# Patient Record
Sex: FEMALE | Race: WHITE | Employment: FULL TIME | URBAN - NONMETROPOLITAN AREA
[De-identification: names, ages, dates, MRNs, and addresses within clinical notes are randomized per-mention and may not be internally consistent; named-entity substitution may affect disease eponyms.]

---

## 2018-01-26 ENCOUNTER — HOSPITAL ENCOUNTER (EMERGENCY)
Age: 38
Discharge: HOME | End: 2018-01-26
Payer: COMMERCIAL

## 2018-01-26 ENCOUNTER — APPOINTMENT (OUTPATIENT)
Dept: CT IMAGING | Age: 38
End: 2018-01-26
Payer: COMMERCIAL

## 2018-01-26 ENCOUNTER — APPOINTMENT (OUTPATIENT)
Dept: ULTRASOUND IMAGING | Age: 38
End: 2018-01-26
Payer: COMMERCIAL

## 2018-01-26 VITALS
BODY MASS INDEX: 17.63 KG/M2 | OXYGEN SATURATION: 96 % | SYSTOLIC BLOOD PRESSURE: 110 MMHG | WEIGHT: 84 LBS | HEART RATE: 83 BPM | DIASTOLIC BLOOD PRESSURE: 61 MMHG | TEMPERATURE: 98 F | HEIGHT: 58 IN | RESPIRATION RATE: 15 BRPM

## 2018-01-26 DIAGNOSIS — N83.209 CYST OF OVARY, UNSPECIFIED LATERALITY: ICD-10-CM

## 2018-01-26 DIAGNOSIS — N39.0 URINARY TRACT INFECTION WITHOUT HEMATURIA, SITE UNSPECIFIED: ICD-10-CM

## 2018-01-26 DIAGNOSIS — R10.9 FLANK PAIN: Primary | ICD-10-CM

## 2018-01-26 LAB
ALBUMIN SERPL-MCNC: 4.5 G/DL (ref 3.5–5.1)
ALP BLD-CCNC: 70 U/L (ref 38–126)
ALT SERPL-CCNC: 9 U/L (ref 11–66)
AMPHETAMINE+METHAMPHETAMINE URINE SCREEN: NORMAL
ANION GAP SERPL CALCULATED.3IONS-SCNC: 14 MEQ/L (ref 8–16)
AST SERPL-CCNC: 11 U/L (ref 5–40)
BACTERIA: ABNORMAL /HPF
BARBITURATE QUANTITATIVE URINE: NEGATIVE
BASOPHILS # BLD: 0.5 %
BASOPHILS ABSOLUTE: 0.1 THOU/MM3 (ref 0–0.1)
BENZODIAZEPINE QUANTITATIVE URINE: NEGATIVE
BILIRUB SERPL-MCNC: 0.3 MG/DL (ref 0.3–1.2)
BILIRUBIN DIRECT: < 0.2 MG/DL (ref 0–0.3)
BILIRUBIN URINE: NEGATIVE
BLOOD, URINE: NEGATIVE
BUN BLDV-MCNC: 15 MG/DL (ref 7–22)
CALCIUM SERPL-MCNC: 9 MG/DL (ref 8.5–10.5)
CANNABINOID QUANTITATIVE URINE: NEGATIVE
CASTS 2: ABNORMAL /LPF
CASTS UA: ABNORMAL /LPF
CHARACTER, URINE: ABNORMAL
CHLORIDE BLD-SCNC: 99 MEQ/L (ref 98–111)
CO2: 25 MEQ/L (ref 23–33)
COCAINE METABOLITE QUANTITATIVE URINE: NEGATIVE
COLOR: ABNORMAL
CREAT SERPL-MCNC: 0.6 MG/DL (ref 0.4–1.2)
CRYSTALS, UA: ABNORMAL
EOSINOPHIL # BLD: 0.7 %
EOSINOPHILS ABSOLUTE: 0.1 THOU/MM3 (ref 0–0.4)
EPITHELIAL CELLS, UA: ABNORMAL /HPF
GFR SERPL CREATININE-BSD FRML MDRD: > 90 ML/MIN/1.73M2
GLUCOSE BLD-MCNC: 99 MG/DL (ref 70–108)
GLUCOSE URINE: NEGATIVE MG/DL
HCT VFR BLD CALC: 44.8 % (ref 37–47)
HEMOGLOBIN: 15.3 GM/DL (ref 12–16)
KETONES, URINE: 15
LACTIC ACID: 0.8 MMOL/L (ref 0.5–2.2)
LEUKOCYTE ESTERASE, URINE: NEGATIVE
LIPASE: 18.9 U/L (ref 5.6–51.3)
LYMPHOCYTES # BLD: 20.5 %
LYMPHOCYTES ABSOLUTE: 2.4 THOU/MM3 (ref 1–4.8)
MCH RBC QN AUTO: 32.5 PG (ref 27–31)
MCHC RBC AUTO-ENTMCNC: 34 GM/DL (ref 33–37)
MCV RBC AUTO: 95.5 FL (ref 81–99)
MISCELLANEOUS 2: ABNORMAL
MONOCYTES # BLD: 4.3 %
MONOCYTES ABSOLUTE: 0.5 THOU/MM3 (ref 0.4–1.3)
NITRITE, URINE: NEGATIVE
NUCLEATED RED BLOOD CELLS: 0 /100 WBC
OPIATES, URINE: NORMAL
OSMOLALITY CALCULATION: 276.5 MOSMOL/KG (ref 275–300)
OXYCODONE: POSITIVE
PDW BLD-RTO: 12.6 % (ref 11.5–14.5)
PH UA: 5.5
PHENCYCLIDINE QUANTITATIVE URINE: NEGATIVE
PLATELET # BLD: 354 THOU/MM3 (ref 130–400)
PMV BLD AUTO: 8.1 MCM (ref 7.4–10.4)
POTASSIUM SERPL-SCNC: 4 MEQ/L (ref 3.5–5.2)
PREGNANCY, SERUM: NEGATIVE
PROTEIN UA: NEGATIVE
RBC # BLD: 4.7 MILL/MM3 (ref 4.2–5.4)
RBC URINE: ABNORMAL /HPF
RENAL EPITHELIAL, UA: ABNORMAL
SEG NEUTROPHILS: 74 %
SEGMENTED NEUTROPHILS ABSOLUTE COUNT: 8.6 THOU/MM3 (ref 1.8–7.7)
SODIUM BLD-SCNC: 138 MEQ/L (ref 135–145)
SPECIFIC GRAVITY, URINE: > 1.03 (ref 1–1.03)
TOTAL PROTEIN: 7.8 G/DL (ref 6.1–8)
UROBILINOGEN, URINE: 0.2 EU/DL
WBC # BLD: 11.6 THOU/MM3 (ref 4.8–10.8)
WBC UA: ABNORMAL /HPF
YEAST: ABNORMAL

## 2018-01-26 PROCEDURE — 6370000000 HC RX 637 (ALT 250 FOR IP): Performed by: PHYSICIAN ASSISTANT

## 2018-01-26 PROCEDURE — 96374 THER/PROPH/DIAG INJ IV PUSH: CPT

## 2018-01-26 PROCEDURE — 80053 COMPREHEN METABOLIC PANEL: CPT

## 2018-01-26 PROCEDURE — 96375 TX/PRO/DX INJ NEW DRUG ADDON: CPT

## 2018-01-26 PROCEDURE — 93975 VASCULAR STUDY: CPT

## 2018-01-26 PROCEDURE — 99284 EMERGENCY DEPT VISIT MOD MDM: CPT

## 2018-01-26 PROCEDURE — 85025 COMPLETE CBC W/AUTO DIFF WBC: CPT

## 2018-01-26 PROCEDURE — 83605 ASSAY OF LACTIC ACID: CPT

## 2018-01-26 PROCEDURE — 6360000002 HC RX W HCPCS: Performed by: PHYSICIAN ASSISTANT

## 2018-01-26 PROCEDURE — 36415 COLL VENOUS BLD VENIPUNCTURE: CPT

## 2018-01-26 PROCEDURE — 74176 CT ABD & PELVIS W/O CONTRAST: CPT

## 2018-01-26 PROCEDURE — 84703 CHORIONIC GONADOTROPIN ASSAY: CPT

## 2018-01-26 PROCEDURE — 76830 TRANSVAGINAL US NON-OB: CPT

## 2018-01-26 PROCEDURE — 83690 ASSAY OF LIPASE: CPT

## 2018-01-26 PROCEDURE — 87086 URINE CULTURE/COLONY COUNT: CPT

## 2018-01-26 PROCEDURE — 80305 DRUG TEST PRSMV DIR OPT OBS: CPT

## 2018-01-26 PROCEDURE — 81001 URINALYSIS AUTO W/SCOPE: CPT

## 2018-01-26 PROCEDURE — 82248 BILIRUBIN DIRECT: CPT

## 2018-01-26 RX ORDER — KETOROLAC TROMETHAMINE 30 MG/ML
30 INJECTION, SOLUTION INTRAMUSCULAR; INTRAVENOUS ONCE
Status: COMPLETED | OUTPATIENT
Start: 2018-01-26 | End: 2018-01-26

## 2018-01-26 RX ORDER — HYDROCODONE BITARTRATE AND ACETAMINOPHEN 5; 325 MG/1; MG/1
1 TABLET ORAL ONCE
Status: COMPLETED | OUTPATIENT
Start: 2018-01-26 | End: 2018-01-26

## 2018-01-26 RX ORDER — ONDANSETRON 2 MG/ML
4 INJECTION INTRAMUSCULAR; INTRAVENOUS ONCE
Status: COMPLETED | OUTPATIENT
Start: 2018-01-26 | End: 2018-01-26

## 2018-01-26 RX ORDER — PHENAZOPYRIDINE HYDROCHLORIDE 200 MG/1
200 TABLET, FILM COATED ORAL 3 TIMES DAILY PRN
Qty: 6 TABLET | Refills: 0 | Status: SHIPPED | OUTPATIENT
Start: 2018-01-26 | End: 2018-01-29

## 2018-01-26 RX ORDER — CIPROFLOXACIN 500 MG/1
500 TABLET, FILM COATED ORAL 2 TIMES DAILY
Qty: 14 TABLET | Refills: 0 | Status: SHIPPED | OUTPATIENT
Start: 2018-01-26 | End: 2018-02-02 | Stop reason: ALTCHOICE

## 2018-01-26 RX ADMIN — ONDANSETRON 4 MG: 2 INJECTION INTRAMUSCULAR; INTRAVENOUS at 08:59

## 2018-01-26 RX ADMIN — KETOROLAC TROMETHAMINE 30 MG: 30 INJECTION, SOLUTION INTRAMUSCULAR at 08:59

## 2018-01-26 RX ADMIN — HYDROCODONE BITARTRATE AND ACETAMINOPHEN 1 TABLET: 5; 325 TABLET ORAL at 10:38

## 2018-01-26 ASSESSMENT — ENCOUNTER SYMPTOMS
COLOR CHANGE: 0
BACK PAIN: 0
EYE DISCHARGE: 0
RHINORRHEA: 0
COUGH: 0
DIARRHEA: 0
SHORTNESS OF BREATH: 0
SORE THROAT: 0
EYE PAIN: 0
NAUSEA: 1
ABDOMINAL PAIN: 1
VOMITING: 0
EYE ITCHING: 0
WHEEZING: 0

## 2018-01-26 ASSESSMENT — PAIN DESCRIPTION - PAIN TYPE
TYPE: ACUTE PAIN
TYPE: ACUTE PAIN

## 2018-01-26 ASSESSMENT — PAIN SCALES - GENERAL
PAINLEVEL_OUTOF10: 5
PAINLEVEL_OUTOF10: 7
PAINLEVEL_OUTOF10: 10
PAINLEVEL_OUTOF10: 8
PAINLEVEL_OUTOF10: 10

## 2018-01-26 ASSESSMENT — PAIN DESCRIPTION - ORIENTATION
ORIENTATION: LEFT;RIGHT
ORIENTATION: RIGHT
ORIENTATION: RIGHT

## 2018-01-26 ASSESSMENT — PATIENT HEALTH QUESTIONNAIRE - PHQ9: SUM OF ALL RESPONSES TO PHQ QUESTIONS 1-9: 8

## 2018-01-26 ASSESSMENT — PAIN DESCRIPTION - LOCATION
LOCATION: FLANK

## 2018-01-26 ASSESSMENT — PAIN DESCRIPTION - FREQUENCY
FREQUENCY: CONTINUOUS
FREQUENCY: CONTINUOUS

## 2018-01-26 ASSESSMENT — PAIN DESCRIPTION - ONSET: ONSET: ON-GOING

## 2018-01-26 ASSESSMENT — PAIN DESCRIPTION - DESCRIPTORS
DESCRIPTORS: STABBING;SHOOTING
DESCRIPTORS: SHOOTING

## 2018-01-26 ASSESSMENT — PAIN DESCRIPTION - DIRECTION
RADIATING_TOWARDS: INTO BACK
RADIATING_TOWARDS: INTO BACK

## 2018-01-26 ASSESSMENT — PAIN DESCRIPTION - PROGRESSION: CLINICAL_PROGRESSION: GRADUALLY IMPROVING

## 2018-01-26 NOTE — PROGRESS NOTES
Brief Intervention and Referral to Treatment Summary    Patient was provided PHQ-9, AUDIT and DAST Screening:      PHQ-9 Score:  8  AUDIT Score:  0  DAST Score:  3    Patients substance use is considered:  Risk, Harmful      Patients depression is considered:  Mild       Brief Education was provided:    Patient was receptive      Brief Intervention Is Provided (Only for AUDIT or DAST, there is no brief intervention for Depression)    Patient reports readiness to decrease and/or stop use and a plan was discussed       Recommendations/Referrals for Brief and/or Specialized Treatment Provided to Patient    Patient reports that her family is new to the area from [place], [place]. Patient states that she normally doesn't use drugs but \"my  persuaded me to try heroin a couple weeks ago because I've never experienced it before. \"  Patient states that she does not fear her . Patient reports that she isn't interested in any other services at this time, but did accept information about [facility] and the [facility] to locate a primary care doctor.

## 2018-01-26 NOTE — ED PROVIDER NOTES
which is within normal limits for the patient's age. No evidence of torsion        **This report has been created using voice recognition software.  It may contain minor errors which are inherent in voice recognition technology. **    Final report electronically signed by Dr. Alexis Franklin on 1/26/2018 12:22 PM            Narrative:    PROCEDURE: US NON OB TRANSVAGINAL, US DUP ABD PEL RETRO SCROT COMP    CLINICAL INFORMATION: rule out torsion pelvic pain . COMPARISON: No prior study. TECHNIQUE: Transvaginal ultrasound with grayscale color and spectral duplex imaging    FINDINGS: Right left ovaries are identified. They show arterial and venous waveforms bilaterally. There is no evidence of torsion. Right ovary demonstrates 2 separate cysts largest is 3.5 cm with no internal echoes. The smaller is 6.5 x 3.5 mm. Has no   internal echoes. Trace free fluid is seen in the right cul-de-sac. Left ovary is unremarkable there is a 1.1 cm sonolucent structure likely a mature follicle. Uterus is normal in size and echogenicity endometrial stripe is mildly thickened but within normal limits for the patient's age. The endometrial stripe is homogeneously thickened. Echogenic focus seen in the right adnexa corresponds to a linear metallic   structure on CT and is most likely an Essure device. Uterus - 8.2 x 5.3 x 4.2 cm  Endometrium - 1.4 cm   Right Ovary - 5.0 x 3.6 x 2.3 cm   Left Ovary - 3.3 x 1.9 x 1.4 cm                    CT ABDOMEN PELVIS WO IV CONTRAST Additional Contrast? None (Final result)   Result time 01/26/18 10:05:01   Final result by Adalberto Arteaga MD (01/26/18 10:05:01)                Impression:    1. There is hypertrophy of the adrenal glands bilaterally. 2. There is a vague 0.1 cm nonobstructing calculus within the midpole the left renal pelvis. The renal pyramids are slightly echogenic bilaterally consistent with nephrocalcinosis. The bilateral kidneys are otherwise unremarkable.  There is no 96% 96%   Weight: 84 lb (38.1 kg)     Height: 4' 10\" (1.473 m)       Patient was seen history physical exam was performed. See disposition below    8:47 AM Patient was seen and evaluated in a timely fashion. The patient was given Toradol. The patient also given 1 Norco PO she is feeling better. Will discharge and treat for UTI    CRITICAL CARE:   None    CONSULTS:  None    PROCEDURES:  None     FINAL IMPRESSION      1. Flank pain    2. Urinary tract infection without hematuria, site unspecified    3. Cyst of ovary, unspecified laterality          DISPOSITION/PLAN   Discharge      PATIENT REFERRED TO:  DR. Elizabeth [facility]go[facility]  [facility]  [address] [facility]  [phone]    In 3 days      Samara Lewis MD  [facility]  [address] [place] [address]    In 3 days        DISCHARGE MEDICATIONS:  Discharge Medication List as of 1/26/2018 12:32 PM      START taking these medications    Details   etodolac (LODINE) 300 MG capsule Take 1 capsule by mouth every 8 hours, Disp-30 capsule, R-0Print      phenazopyridine (PYRIDIUM) 200 MG tablet Take 1 tablet by mouth 3 times daily as needed for Pain (bladder spasm/pain), Disp-6 tablet, R-0Print      ciprofloxacin (CIPRO) 500 MG tablet Take 1 tablet by mouth 2 times daily for 7 days, Disp-14 tablet, R-0Print             (Please note that portions of this note were completed with a voice recognition program.  Efforts were made to edit the dictations but occasionally words are mis-transcribed.)    Scribe: Den Byrd 01/26/18 8:47 AM Scribing for and in the presence of [facility] CHAR[facility]. Signed by: Derik Sanchez, 01/26/18 6:02 PM    Provider:  I personally performed the services described in the documentation, reviewed and edited the documentation which was dictated to the scribe in my presence, and it accurately records my words and actions.     [facility] CHAR[facility] 01/26/18 6:02 PM Ashia Coulter, [place]  01/26/18 180

## 2018-01-27 LAB
ORGANISM: ABNORMAL
URINE CULTURE REFLEX: ABNORMAL

## 2018-02-02 ENCOUNTER — TELEPHONE (OUTPATIENT)
Dept: UROLOGY | Age: 38
End: 2018-02-02

## 2018-02-02 ENCOUNTER — OFFICE VISIT (OUTPATIENT)
Dept: UROLOGY | Age: 38
End: 2018-02-02
Payer: COMMERCIAL

## 2018-02-02 VITALS
DIASTOLIC BLOOD PRESSURE: 80 MMHG | WEIGHT: 99 LBS | SYSTOLIC BLOOD PRESSURE: 126 MMHG | HEIGHT: 58 IN | BODY MASS INDEX: 20.78 KG/M2

## 2018-02-02 DIAGNOSIS — N20.0 KIDNEY STONE: Primary | ICD-10-CM

## 2018-02-02 DIAGNOSIS — R10.84 GENERALIZED ABDOMINAL PAIN: ICD-10-CM

## 2018-02-02 DIAGNOSIS — R10.9 RIGHT FLANK PAIN: ICD-10-CM

## 2018-02-02 LAB
BILIRUBIN URINE: NORMAL
BLOOD URINE, POC: NEGATIVE
CHARACTER, URINE: CLEAR
COLOR, URINE: YELLOW
GLUCOSE URINE: NEGATIVE MG/DL
KETONES, URINE: NEGATIVE
LEUKOCYTE CLUMPS, URINE: NEGATIVE
NITRITE, URINE: NEGATIVE
PH, URINE: 7
PROTEIN, URINE: NEGATIVE MG/DL
SPECIFIC GRAVITY, URINE: 1.02 (ref 1–1.03)
UROBILINOGEN, URINE: 0.2 EU/DL

## 2018-02-02 PROCEDURE — G8427 DOCREV CUR MEDS BY ELIG CLIN: HCPCS | Performed by: NURSE PRACTITIONER

## 2018-02-02 PROCEDURE — G8484 FLU IMMUNIZE NO ADMIN: HCPCS | Performed by: NURSE PRACTITIONER

## 2018-02-02 PROCEDURE — 99203 OFFICE O/P NEW LOW 30 MIN: CPT | Performed by: NURSE PRACTITIONER

## 2018-02-02 PROCEDURE — G8420 CALC BMI NORM PARAMETERS: HCPCS | Performed by: NURSE PRACTITIONER

## 2018-02-02 PROCEDURE — 81003 URINALYSIS AUTO W/O SCOPE: CPT | Performed by: NURSE PRACTITIONER

## 2018-02-02 PROCEDURE — 4004F PT TOBACCO SCREEN RCVD TLK: CPT | Performed by: NURSE PRACTITIONER

## 2018-02-02 RX ORDER — KETOROLAC TROMETHAMINE 10 MG/1
10 TABLET, FILM COATED ORAL EVERY 6 HOURS PRN
Qty: 20 TABLET | Refills: 0 | Status: SHIPPED | OUTPATIENT
Start: 2018-02-02 | End: 2018-02-02

## 2018-02-02 RX ORDER — KETOROLAC TROMETHAMINE 10 MG/1
10 TABLET, FILM COATED ORAL EVERY 6 HOURS PRN
Qty: 20 TABLET | Refills: 0 | Status: SHIPPED | OUTPATIENT
Start: 2018-02-02 | End: 2018-03-07 | Stop reason: ALTCHOICE

## 2018-02-02 NOTE — PATIENT INSTRUCTIONS
are trying to quit smoking. · Consider signing up for a smoking cessation program, such as the American Lung Association's Freedom from Smoking program.  · Set a quit date. Pick your date carefully so that it is not right in the middle of a big deadline or stressful time. Once you quit, do not even take a puff. Get rid of all ashtrays and lighters after your last cigarette. Clean your house and your clothes so that they do not smell of smoke. · Learn how to be a nonsmoker. Think about ways you can avoid those things that make you reach for a cigarette. ¨ Avoid situations that put you at greatest risk for smoking. For some people, it is hard to have a drink with friends without smoking. For others, they might skip a coffee break with coworkers who smoke. ¨ Change your daily routine. Take a different route to work or eat a meal in a different place. · Cut down on stress. Calm yourself or release tension by doing an activity you enjoy, such as reading a book, taking a hot bath, or gardening. · Talk to your doctor or pharmacist about nicotine replacement therapy, which replaces the nicotine in your body. You still get nicotine but you do not use tobacco. Nicotine replacement products help you slowly reduce the amount of nicotine you need. These products come in several forms, many of them available over-the-counter:  ¨ Nicotine patches  ¨ Nicotine gum and lozenges  ¨ Nicotine inhaler  · Ask your doctor about bupropion (Wellbutrin) or varenicline (Chantix), which are prescription medicines. They do not contain nicotine. They help you by reducing withdrawal symptoms, such as stress and anxiety. · Some people find hypnosis, acupuncture, and massage helpful for ending the smoking habit. · Eat a healthy diet and get regular exercise. Having healthy habits will help your body move past its craving for nicotine. · Be prepared to keep trying. Most people are not successful the first few times they try to quit.  Do not get mad at yourself if you smoke again. Make a list of things you learned and think about when you want to try again, such as next week, next month, or next year. Where can you learn more? Go to https://[email or web address].Ad Summos. org and sign in to your [employer] account. Enter N100 in the Kaazing box to learn more about \"Stopping Smoking: Care Instructions. \"     If you do not have an account, please click on the \"Sign Up Now\" link. Current as of: March 20, 2017  Content Version: 11.5  © 5383-5489 Healthwise, Incorporated. Care instructions adapted under license by [facility] ([facility]). If you have questions about a medical condition or this instruction, always ask your healthcare professional. [address] any warranty or liability for your use of this information.

## 2018-02-02 NOTE — PROGRESS NOTES
anxiety or depression. Skin: Denies rash or lesions. The remainder of the complete ROS is negative. Physical Exam  Nursing note and vitals reviewed. Constitutional: Alert and oriented times 3, no acute distress and cooperative to examination with appropriate mood and affect. HENT:   Head:        Normocephalic and atraumatic. Mouth/Throat:         Mucous membranes are normal.   Eyes:         EOM are normal. No scleral icterus. PERRLA. Neck:        Supple, symmetrical, trachea midline, no adenopathy, thyroid symmetric, not enlarged and no tenderness. Cardiovascular:        Normal rate, regular rhythm, S1 S2 heart sounds. No murmurs, rub, or gallops. Pulses:       Radial pulses are 2+/4 bilateral and equal. Posterior tibialis 2+/4 bilateral and equal  Pulmonary/Chest:      Chest symmetric with normal A/P diameter,  CTA with no wheezes, rales, or rhonchi noted. Normal respiratory rate and rhthym. No use of accessory muscles. Abdominal:         Soft. No tenderness. No rebound or guarding. No CVA tenderness. Musculoskeletal:         Normal range of motion. No evidence of edema or tenderness of lower extremities. Lymphadenopathy:        No cervical adenopathy. Bilateral supraclavicular adenopathy absent. Extremities: No cyanosis, clubbing, or edema present. Neurological:        Alert and oriented. No cranial nerve deficit. There are no focalizing motor or sensory deficits. CN II-XII are grossly intact. Skin:       Skin color, texture, turgor normal. No rashes or lesions. Psychiatric:        Normal mood and affect.      Labs   WBC:    Lab Results   Component Value Date    WBC 11.6 01/26/2018     Hemoglobin/Hematocrit:    Lab Results   Component Value Date    HGB 15.3 01/26/2018    HCT 44.8 01/26/2018     BMP:    Lab Results   Component Value Date     01/26/2018    K 4.0 01/26/2018    CL 99 01/26/2018    CO2 25 01/26/2018    BUN 15 01/26/2018    LABALBU 4.5 01/26/2018    CREATININE

## 2018-02-09 ENCOUNTER — HOSPITAL ENCOUNTER (EMERGENCY)
Age: 38
Discharge: HOME | End: 2018-02-09
Payer: COMMERCIAL

## 2018-02-09 ENCOUNTER — APPOINTMENT (OUTPATIENT)
Dept: CT IMAGING | Age: 38
End: 2018-02-09
Payer: COMMERCIAL

## 2018-02-09 VITALS
HEART RATE: 88 BPM | HEIGHT: 59 IN | DIASTOLIC BLOOD PRESSURE: 72 MMHG | OXYGEN SATURATION: 98 % | WEIGHT: 95 LBS | TEMPERATURE: 99 F | BODY MASS INDEX: 19.15 KG/M2 | RESPIRATION RATE: 16 BRPM | SYSTOLIC BLOOD PRESSURE: 130 MMHG

## 2018-02-09 DIAGNOSIS — N83.201 CYST OF RIGHT OVARY: ICD-10-CM

## 2018-02-09 DIAGNOSIS — R10.30 LOWER ABDOMINAL PAIN: Primary | ICD-10-CM

## 2018-02-09 LAB
ALBUMIN SERPL-MCNC: 4.1 G/DL (ref 3.5–5.1)
ALP BLD-CCNC: 68 U/L (ref 38–126)
ALT SERPL-CCNC: 7 U/L (ref 11–66)
ANION GAP SERPL CALCULATED.3IONS-SCNC: 9 MEQ/L (ref 8–16)
AST SERPL-CCNC: 17 U/L (ref 5–40)
BACTERIA: ABNORMAL
BASOPHILS # BLD: 0.3 %
BASOPHILS ABSOLUTE: 0 THOU/MM3 (ref 0–0.1)
BILIRUB SERPL-MCNC: 0.2 MG/DL (ref 0.3–1.2)
BILIRUBIN DIRECT: < 0.2 MG/DL (ref 0–0.3)
BILIRUBIN URINE: NEGATIVE
BLOOD, URINE: ABNORMAL
BUN BLDV-MCNC: 14 MG/DL (ref 7–22)
CALCIUM SERPL-MCNC: 9.1 MG/DL (ref 8.5–10.5)
CASTS: ABNORMAL /LPF
CASTS: ABNORMAL /LPF
CHARACTER, URINE: ABNORMAL
CHLORIDE BLD-SCNC: 102 MEQ/L (ref 98–111)
CO2: 28 MEQ/L (ref 23–33)
COLOR: YELLOW
CREAT SERPL-MCNC: 0.5 MG/DL (ref 0.4–1.2)
CRYSTALS: ABNORMAL
EOSINOPHIL # BLD: 0.8 %
EOSINOPHILS ABSOLUTE: 0.1 THOU/MM3 (ref 0–0.4)
EPITHELIAL CELLS, UA: ABNORMAL /HPF
GFR SERPL CREATININE-BSD FRML MDRD: > 90 ML/MIN/1.73M2
GLUCOSE BLD-MCNC: 103 MG/DL (ref 70–108)
GLUCOSE, URINE: NEGATIVE MG/DL
HCT VFR BLD CALC: 43.2 % (ref 37–47)
HEMOGLOBIN: 14.5 GM/DL (ref 12–16)
KETONES, URINE: NEGATIVE
LEUKOCYTE ESTERASE, URINE: ABNORMAL
LIPASE: 21.9 U/L (ref 5.6–51.3)
LYMPHOCYTES # BLD: 21.5 %
LYMPHOCYTES ABSOLUTE: 1.8 THOU/MM3 (ref 1–4.8)
MCH RBC QN AUTO: 31.7 PG (ref 27–31)
MCHC RBC AUTO-ENTMCNC: 33.5 GM/DL (ref 33–37)
MCV RBC AUTO: 94.5 FL (ref 81–99)
MISCELLANEOUS LAB TEST RESULT: ABNORMAL
MONOCYTES # BLD: 5.8 %
MONOCYTES ABSOLUTE: 0.5 THOU/MM3 (ref 0.4–1.3)
NITRITE, URINE: NEGATIVE
NUCLEATED RED BLOOD CELLS: 0 /100 WBC
OSMOLALITY CALCULATION: 278.3 MOSMOL/KG (ref 275–300)
PDW BLD-RTO: 13.4 % (ref 11.5–14.5)
PH UA: 7.5
PLATELET # BLD: 341 THOU/MM3 (ref 130–400)
PMV BLD AUTO: 7.8 FL (ref 7.4–10.4)
POTASSIUM SERPL-SCNC: 3.8 MEQ/L (ref 3.5–5.2)
PREGNANCY, SERUM: NEGATIVE
PROTEIN UA: ABNORMAL MG/DL
RBC # BLD: 4.57 MILL/MM3 (ref 4.2–5.4)
RBC URINE: ABNORMAL /HPF
RENAL EPITHELIAL, UA: ABNORMAL
SEG NEUTROPHILS: 71.6 %
SEGMENTED NEUTROPHILS ABSOLUTE COUNT: 6.2 THOU/MM3 (ref 1.8–7.7)
SODIUM BLD-SCNC: 139 MEQ/L (ref 135–145)
SPECIFIC GRAVITY UA: 1.02 (ref 1–1.03)
TOTAL PROTEIN: 7.3 G/DL (ref 6.1–8)
UROBILINOGEN, URINE: 1 EU/DL
WBC # BLD: 8.6 THOU/MM3 (ref 4.8–10.8)
WBC UA: ABNORMAL /HPF
YEAST: ABNORMAL

## 2018-02-09 PROCEDURE — 74177 CT ABD & PELVIS W/CONTRAST: CPT

## 2018-02-09 PROCEDURE — 96374 THER/PROPH/DIAG INJ IV PUSH: CPT

## 2018-02-09 PROCEDURE — 85025 COMPLETE CBC W/AUTO DIFF WBC: CPT

## 2018-02-09 PROCEDURE — 36415 COLL VENOUS BLD VENIPUNCTURE: CPT

## 2018-02-09 PROCEDURE — 87086 URINE CULTURE/COLONY COUNT: CPT

## 2018-02-09 PROCEDURE — 84703 CHORIONIC GONADOTROPIN ASSAY: CPT

## 2018-02-09 PROCEDURE — 82248 BILIRUBIN DIRECT: CPT

## 2018-02-09 PROCEDURE — 83690 ASSAY OF LIPASE: CPT

## 2018-02-09 PROCEDURE — 99284 EMERGENCY DEPT VISIT MOD MDM: CPT

## 2018-02-09 PROCEDURE — 6360000002 HC RX W HCPCS: Performed by: NURSE PRACTITIONER

## 2018-02-09 PROCEDURE — 6360000004 HC RX CONTRAST MEDICATION: Performed by: NURSE PRACTITIONER

## 2018-02-09 PROCEDURE — 6370000000 HC RX 637 (ALT 250 FOR IP): Performed by: NURSE PRACTITIONER

## 2018-02-09 PROCEDURE — 80053 COMPREHEN METABOLIC PANEL: CPT

## 2018-02-09 PROCEDURE — 96375 TX/PRO/DX INJ NEW DRUG ADDON: CPT

## 2018-02-09 PROCEDURE — 81001 URINALYSIS AUTO W/SCOPE: CPT

## 2018-02-09 RX ORDER — TRAMADOL HYDROCHLORIDE 50 MG/1
50 TABLET ORAL EVERY 6 HOURS PRN
Qty: 12 TABLET | Refills: 0 | Status: SHIPPED | OUTPATIENT
Start: 2018-02-09 | End: 2018-02-12

## 2018-02-09 RX ORDER — ONDANSETRON 2 MG/ML
4 INJECTION INTRAMUSCULAR; INTRAVENOUS ONCE
Status: COMPLETED | OUTPATIENT
Start: 2018-02-09 | End: 2018-02-09

## 2018-02-09 RX ORDER — TRAMADOL HYDROCHLORIDE 50 MG/1
50 TABLET ORAL ONCE
Status: COMPLETED | OUTPATIENT
Start: 2018-02-09 | End: 2018-02-09

## 2018-02-09 RX ORDER — MORPHINE SULFATE 4 MG/ML
4 INJECTION, SOLUTION INTRAMUSCULAR; INTRAVENOUS ONCE
Status: COMPLETED | OUTPATIENT
Start: 2018-02-09 | End: 2018-02-09

## 2018-02-09 RX ADMIN — ONDANSETRON 4 MG: 2 INJECTION INTRAMUSCULAR; INTRAVENOUS at 15:42

## 2018-02-09 RX ADMIN — TRAMADOL HYDROCHLORIDE 50 MG: 50 TABLET, FILM COATED ORAL at 16:55

## 2018-02-09 RX ADMIN — IOPAMIDOL 80 ML: 755 INJECTION, SOLUTION INTRAVENOUS at 16:14

## 2018-02-09 RX ADMIN — MORPHINE SULFATE 4 MG: 4 INJECTION, SOLUTION INTRAMUSCULAR; INTRAVENOUS at 15:42

## 2018-02-09 ASSESSMENT — ENCOUNTER SYMPTOMS
CHEST TIGHTNESS: 0
SORE THROAT: 0
ABDOMINAL PAIN: 1
SHORTNESS OF BREATH: 0

## 2018-02-09 ASSESSMENT — PAIN SCALES - GENERAL
PAINLEVEL_OUTOF10: 10
PAINLEVEL_OUTOF10: 10
PAINLEVEL_OUTOF10: 7

## 2018-02-09 ASSESSMENT — PAIN DESCRIPTION - ORIENTATION: ORIENTATION: LOWER

## 2018-02-09 ASSESSMENT — PAIN DESCRIPTION - PAIN TYPE: TYPE: ACUTE PAIN

## 2018-02-09 ASSESSMENT — PAIN DESCRIPTION - LOCATION: LOCATION: ABDOMEN

## 2018-02-09 ASSESSMENT — PAIN DESCRIPTION - DESCRIPTORS: DESCRIPTORS: STABBING;SHARP

## 2018-02-09 ASSESSMENT — PAIN DESCRIPTION - FREQUENCY: FREQUENCY: CONTINUOUS

## 2018-02-09 NOTE — ED PROVIDER NOTES
as needed for Pain       ALLERGIES     is allergic to phenergan [promethazine]. FAMILY HISTORY     has no family status information on file. family history is not on file. SOCIAL HISTORY      reports that she has been smoking Cigarettes. She has been smoking about 0.50 packs per day. She has never used smokeless tobacco. She reports that she drinks alcohol. She reports that she does not use drugs. PHYSICAL EXAM     INITIAL VITALS:  height is 4' 11\" (1.499 m) and weight is 95 lb (43.1 kg). Her oral temperature is 99 °F (37.2 °C). Her blood pressure is 116/64 and her pulse is 95. Her respiration is 18 and oxygen saturation is 99%. Physical Exam   Constitutional: She is oriented to person, place, and time. She appears well-developed and well-nourished. HENT:   Head: Normocephalic. Eyes: Pupils are equal, round, and reactive to light. Neck: Normal range of motion. Cardiovascular: Regular rhythm and normal heart sounds. Tachycardia present. Pulmonary/Chest: Effort normal and breath sounds normal.   Abdominal: Soft. Bowel sounds are normal. She exhibits no mass. There is tenderness (lower abdomen). There is CVA tenderness (right sided). There is no guarding. Neurological: She is alert and oriented to person, place, and time. Skin: Skin is warm. Psychiatric: She has a normal mood and affect. DIFFERENTIAL DIAGNOSIS:   UTI, pyelonephritis, kidney stone, Reported issues with Essure device    DIAGNOSTIC RESULTS     EKG: All EKG's are interpreted by the Emergency Department Physician who either signs or Co-signs this chart in the absence of a cardiologist.  none    RADIOLOGY: non-plain film images(s) such as CT, Ultrasound and MRI are read by the radiologist.  Plain radiographic images are visualized and preliminarily interpreted by the emergency physician unless otherwise stated below. CT ABDOMEN PELVIS W IV CONTRAST Additional Contrast? None   Final Result   1.  No acute intra-abdominal actions.     Elvia Foster, CNP 02/09/18 4:43 PM    [facility], [address] [place] [address], APRN  02/09/18 [id number]

## 2018-02-09 NOTE — LETTER
[facility] [address] [address] EMERGENCY DEPT  [address]  [address] [place] [address]  Phone: [phone]               February 9, 2018    Patient: Sanju Fried   YOB: 1980   Date of Visit: 2/9/2018       To Whom It May Concern:    Flory Roberts was seen and treated in our emergency department on 2/9/2018. She may return to work on Mon 2/12/18.       Sincerely,       Delmi Dean RN         Signature:__________________________________

## 2018-02-10 LAB
ORGANISM: ABNORMAL
URINE CULTURE, ROUTINE: ABNORMAL

## 2018-09-12 ENCOUNTER — APPOINTMENT (OUTPATIENT)
Dept: GENERAL RADIOLOGY | Age: 38
End: 2018-09-12

## 2018-09-12 ENCOUNTER — HOSPITAL ENCOUNTER (EMERGENCY)
Age: 38
Discharge: HOME | End: 2018-09-12

## 2018-09-12 VITALS
TEMPERATURE: 98 F | SYSTOLIC BLOOD PRESSURE: 106 MMHG | HEART RATE: 68 BPM | BODY MASS INDEX: 20.96 KG/M2 | RESPIRATION RATE: 18 BRPM | DIASTOLIC BLOOD PRESSURE: 65 MMHG | OXYGEN SATURATION: 100 % | WEIGHT: 104 LBS | HEIGHT: 59 IN

## 2018-09-12 DIAGNOSIS — S90.122A CONTUSION OF LESSER TOE OF LEFT FOOT WITHOUT DAMAGE TO NAIL, INITIAL ENCOUNTER: ICD-10-CM

## 2018-09-12 DIAGNOSIS — S93.519A SPRAIN OF INTERPHALANGEAL JOINT OF TOE, INITIAL ENCOUNTER: Primary | ICD-10-CM

## 2018-09-12 PROCEDURE — 99283 EMERGENCY DEPT VISIT LOW MDM: CPT

## 2018-09-12 PROCEDURE — L3260 AMBULATORY SURGICAL BOOT EAC: HCPCS

## 2018-09-12 PROCEDURE — 73630 X-RAY EXAM OF FOOT: CPT

## 2018-09-12 RX ORDER — IBUPROFEN 600 MG/1
600 TABLET ORAL EVERY 6 HOURS PRN
Qty: 30 TABLET | Refills: 0 | Status: SHIPPED | OUTPATIENT
Start: 2018-09-12

## 2018-09-12 ASSESSMENT — PAIN DESCRIPTION - DESCRIPTORS: DESCRIPTORS: ACHING;SHOOTING

## 2018-09-12 ASSESSMENT — ENCOUNTER SYMPTOMS
DIARRHEA: 0
SINUS PRESSURE: 0
ABDOMINAL DISTENTION: 0
NAUSEA: 0
CHEST TIGHTNESS: 0
RHINORRHEA: 0
WHEEZING: 0
PHOTOPHOBIA: 0
ABDOMINAL PAIN: 0
VOMITING: 0
COLOR CHANGE: 0
EYE REDNESS: 0
VOICE CHANGE: 0
BACK PAIN: 0
CONSTIPATION: 0
SORE THROAT: 0
COUGH: 0
BLOOD IN STOOL: 0
SHORTNESS OF BREATH: 0

## 2018-09-12 ASSESSMENT — PAIN DESCRIPTION - PAIN TYPE: TYPE: ACUTE PAIN

## 2018-09-12 ASSESSMENT — PAIN SCALES - GENERAL: PAINLEVEL_OUTOF10: 9

## 2018-09-12 ASSESSMENT — PAIN DESCRIPTION - PROGRESSION: CLINICAL_PROGRESSION: GRADUALLY WORSENING

## 2018-09-12 ASSESSMENT — PAIN DESCRIPTION - ORIENTATION: ORIENTATION: LEFT

## 2018-09-12 ASSESSMENT — PAIN DESCRIPTION - FREQUENCY: FREQUENCY: CONTINUOUS

## 2018-09-12 NOTE — ED PROVIDER NOTES
[facility] Emergency Department    CHIEF COMPLAINT       Chief Complaint   Patient presents with    Foot Injury     left       Nurses Notes reviewed and I agree except as noted in the HPI. HISTORY OF PRESENT ILLNESS    Dorcas Posadas is a 41 y.o. female who presents to the ED for evaluation of left foot pain. The patient states that her symptoms started yesterday morning when she woke up and kicked a door on the way to the bathroom. She describes her pain as continuous aching, shooting pain and rates it as a 9/10 in severity. The patient states that movement makes her symptoms worse. The patient has used ice and tylenol with limited improvement. She denies any other signs or symptoms at this time. Pain description:  Onset: yesterday morning  Location: left lateral foot  Duration: continuous  Character: aching shooting   Aggravating factors: worse with certain movements, limited improvement from Ice and tylenol  Radiation: none  Timing: continuous  Severity: 9/10    Experienced previously: No    HPI was provided by the patient. REVIEW OF SYSTEMS     Review of Systems   Constitutional: Negative for appetite change, chills, diaphoresis, fatigue, fever and unexpected weight change. HENT: Negative for congestion, hearing loss, postnasal drip, rhinorrhea, sinus pressure, sore throat and voice change. Eyes: Negative for photophobia, redness and visual disturbance. Respiratory: Negative for cough, chest tightness, shortness of breath and wheezing. Cardiovascular: Negative for chest pain and palpitations. Gastrointestinal: Negative for abdominal distention, abdominal pain, blood in stool, constipation, diarrhea, nausea and vomiting. Endocrine: Negative for cold intolerance, heat intolerance, polydipsia, polyphagia and polyuria. Genitourinary: Negative for difficulty urinating, dysuria, flank pain, frequency and vaginal pain.    Musculoskeletal: Positive for arthralgias (left dorsal lateral foot in her maternal grandfather, maternal grandmother, and sister; Heart Attack in her paternal grandfather; High Blood Pressure in her mother. SOCIAL HISTORY      reports that she has been smoking Cigarettes. She has been smoking about 0.50 packs per day. She has never used smokeless tobacco. She reports that she does not drink alcohol or use drugs. PHYSICAL EXAM     INITIAL VITALS:  height is 4' 11\" (1.499 m) and weight is 104 lb (47.2 kg). Her oral temperature is 98 °F (36.7 °C). Her blood pressure is 106/65 and her pulse is 68. Her respiration is 18 and oxygen saturation is 100%. Physical Exam   Constitutional: She is oriented to person, place, and time. She appears well-developed and well-nourished. HENT:   Head: Normocephalic. Right Ear: External ear normal.   Left Ear: External ear normal.   Mouth/Throat: Uvula is midline and oropharynx is clear and moist.   Eyes: Pupils are equal, round, and reactive to light. Conjunctivae and EOM are normal.   Neck: Normal range of motion. Neck supple. Cardiovascular: Normal rate, regular rhythm, S1 normal, S2 normal, normal heart sounds and intact distal pulses. Pulmonary/Chest: Effort normal and breath sounds normal. No respiratory distress. She exhibits no tenderness. Abdominal: Soft. Normal appearance and bowel sounds are normal. She exhibits no distension. There is no tenderness. Musculoskeletal: Normal range of motion. Left ankle: She exhibits normal range of motion, no swelling, no ecchymosis, no deformity, no laceration and normal pulse. No tenderness. Left lower leg: She exhibits no tenderness, no bony tenderness, no swelling, no edema, no deformity and no laceration. Left foot: There is tenderness (tenderness to the dorsal lateral aspect of the left foot near the base of the 5th digit). There is normal range of motion, no bony tenderness, no swelling, normal capillary refill, no crepitus, no deformity and no laceration. Lymphadenopathy:     She has no cervical adenopathy. Neurological: She is alert and oriented to person, place, and time. Skin: Skin is warm, dry and intact. Psychiatric: She has a normal mood and affect. Her speech is normal and behavior is normal. Thought content normal.   Nursing note and vitals reviewed. DIFFERENTIAL DIAGNOSIS:   Fracture, strain, sprain, contusion    DIAGNOSTIC RESULTS     EKG: All EKG's are interpreted by the Emergency Department Physician who either signs or Co-signs this chart in the absence of a cardiologist.    None    RADIOLOGY: non-plain film images(s) such as CT, Ultrasound and MRI are read by the radiologist.  Plain radiographic images are visualized and preliminarily interpreted by the emergency physician unless otherwise stated below. XR FOOT LEFT (MIN 3 VIEWS)   Final Result   No acute fracture or dislocation. **This report has been created using voice recognition software. It may contain minor errors which are inherent in voice recognition technology. **      Final report electronically signed by Dr. Courtney Schmitt on 9/12/2018 12:48 PM            LABS:   Labs Reviewed - No data to display    EMERGENCY DEPARTMENT COURSE:   Vitals:    Vitals:    09/12/18 1217   BP: 106/65   Pulse: 68   Resp: 18   Temp: 98 °F (36.7 °C)   TempSrc: Oral   SpO2: 100%   Weight: 104 lb (47.2 kg)   Height: 4' 11\" (1.499 m)     MDM    Medications - No data to display    The patient was seen and evaluated in a timely manner for left foot pain. His vital signs were stable. During the physical exam I noted tenderness to the left dorsal lateral foot near the base of the 5th digit. I ordered an x-ray of the foot which showed no acute fracture or dislocation. Imaging results were reviewed and discussed with the patient. Within the department, the patient was provided with a post-op shoe. The patient responded well to treatment, and I noted her condition to remain stable.  I decided that the

## 2018-09-12 NOTE — ED NOTES
Presents with complaints of left foot pain due to hitting a door this morning.      Ramesh De La O RN  09/12/18 [id number]